# Patient Record
Sex: FEMALE | Race: WHITE | URBAN - METROPOLITAN AREA
[De-identification: names, ages, dates, MRNs, and addresses within clinical notes are randomized per-mention and may not be internally consistent; named-entity substitution may affect disease eponyms.]

---

## 2019-12-05 LAB
C. TRACHOMATIS, EXTERNAL RESULT: NEGATIVE
N. GONORRHOEAE, EXTERNAL RESULT: NEGATIVE

## 2019-12-19 LAB
ABO, EXTERNAL RESULT: NORMAL
HEP B, EXTERNAL RESULT: NEGATIVE
HIV, EXTERNAL RESULT: NORMAL
RH FACTOR, EXTERNAL RESULT: POSITIVE
RPR, EXTERNAL RESULT: NORMAL
RUBELLA TITER, EXTERNAL RESULT: NORMAL

## 2020-06-07 LAB — GBS, EXTERNAL RESULT: NEGATIVE

## 2020-06-30 ENCOUNTER — OFFICE VISIT (OUTPATIENT)
Dept: PRIMARY CARE CLINIC | Age: 30
End: 2020-06-30
Payer: COMMERCIAL

## 2020-06-30 PROCEDURE — 99211 OFF/OP EST MAY X REQ PHY/QHP: CPT | Performed by: NURSE PRACTITIONER

## 2020-06-30 NOTE — PROGRESS NOTES
Patient is having a  with Ayush Mukherjee on 09 and was seen at clinic for prenatal covid test. . Patient instructed to self quarantine until she goes into labor or is instructed to go to hospital.

## 2020-07-02 LAB
SARS-COV-2: NOT DETECTED
SOURCE: NORMAL

## 2020-07-07 ENCOUNTER — HOSPITAL ENCOUNTER (INPATIENT)
Age: 30
LOS: 2 days | Discharge: HOME | End: 2020-07-09
Attending: OBSTETRICS & GYNECOLOGY | Admitting: OBSTETRICS & GYNECOLOGY
Payer: COMMERCIAL

## 2020-07-07 ENCOUNTER — ANESTHESIA (OUTPATIENT)
Dept: LABOR AND DELIVERY | Age: 30
End: 2020-07-07
Payer: COMMERCIAL

## 2020-07-07 ENCOUNTER — ANESTHESIA EVENT (OUTPATIENT)
Dept: LABOR AND DELIVERY | Age: 30
End: 2020-07-07
Payer: COMMERCIAL

## 2020-07-07 VITALS — DIASTOLIC BLOOD PRESSURE: 73 MMHG | SYSTOLIC BLOOD PRESSURE: 120 MMHG | OXYGEN SATURATION: 100 %

## 2020-07-07 PROBLEM — Z98.891 HX OF CESAREAN SECTION: Status: ACTIVE | Noted: 2020-07-07

## 2020-07-07 LAB
ABO/RH: NORMAL
AMPHETAMINE SCREEN, URINE: NORMAL
ANTIBODY SCREEN: NORMAL
BARBITURATE SCREEN URINE: NORMAL
BASOPHILS ABSOLUTE: 0.1 K/UL (ref 0–0.2)
BASOPHILS RELATIVE PERCENT: 0.5 %
BENZODIAZEPINE SCREEN, URINE: NORMAL
BUPRENORPHINE URINE: NORMAL
CANNABINOID SCREEN URINE: NORMAL
COCAINE METABOLITE SCREEN URINE: NORMAL
EOSINOPHILS ABSOLUTE: 0.1 K/UL (ref 0–0.6)
EOSINOPHILS RELATIVE PERCENT: 1 %
HCT VFR BLD CALC: 35.5 % (ref 36–48)
HEMOGLOBIN: 12.1 G/DL (ref 12–16)
LYMPHOCYTES ABSOLUTE: 1.8 K/UL (ref 1–5.1)
LYMPHOCYTES RELATIVE PERCENT: 19.2 %
Lab: NORMAL
MCH RBC QN AUTO: 33.5 PG (ref 26–34)
MCHC RBC AUTO-ENTMCNC: 34 G/DL (ref 31–36)
MCV RBC AUTO: 98.6 FL (ref 80–100)
METHADONE SCREEN, URINE: NORMAL
MONOCYTES ABSOLUTE: 0.7 K/UL (ref 0–1.3)
MONOCYTES RELATIVE PERCENT: 6.8 %
NEUTROPHILS ABSOLUTE: 6.9 K/UL (ref 1.7–7.7)
NEUTROPHILS RELATIVE PERCENT: 72.5 %
OPIATE SCREEN URINE: NORMAL
OXYCODONE URINE: NORMAL
PDW BLD-RTO: 16.6 % (ref 12.4–15.4)
PH UA: 7
PHENCYCLIDINE SCREEN URINE: NORMAL
PLATELET # BLD: 167 K/UL (ref 135–450)
PMV BLD AUTO: 9.7 FL (ref 5–10.5)
PROPOXYPHENE SCREEN: NORMAL
RBC # BLD: 3.6 M/UL (ref 4–5.2)
WBC # BLD: 9.5 K/UL (ref 4–11)

## 2020-07-07 PROCEDURE — 85025 COMPLETE CBC W/AUTO DIFF WBC: CPT

## 2020-07-07 PROCEDURE — 1220000000 HC SEMI PRIVATE OB R&B

## 2020-07-07 PROCEDURE — 86850 RBC ANTIBODY SCREEN: CPT

## 2020-07-07 PROCEDURE — 7100000001 HC PACU RECOVERY - ADDTL 15 MIN: Performed by: OBSTETRICS & GYNECOLOGY

## 2020-07-07 PROCEDURE — 86901 BLOOD TYPING SEROLOGIC RH(D): CPT

## 2020-07-07 PROCEDURE — 2500000003 HC RX 250 WO HCPCS: Performed by: NURSE ANESTHETIST, CERTIFIED REGISTERED

## 2020-07-07 PROCEDURE — 3609079900 HC CESAREAN SECTION: Performed by: OBSTETRICS & GYNECOLOGY

## 2020-07-07 PROCEDURE — 86900 BLOOD TYPING SEROLOGIC ABO: CPT

## 2020-07-07 PROCEDURE — 6360000002 HC RX W HCPCS: Performed by: OBSTETRICS & GYNECOLOGY

## 2020-07-07 PROCEDURE — 2580000003 HC RX 258: Performed by: OBSTETRICS & GYNECOLOGY

## 2020-07-07 PROCEDURE — 2709999900 HC NON-CHARGEABLE SUPPLY: Performed by: OBSTETRICS & GYNECOLOGY

## 2020-07-07 PROCEDURE — 3700000001 HC ADD 15 MINUTES (ANESTHESIA): Performed by: OBSTETRICS & GYNECOLOGY

## 2020-07-07 PROCEDURE — 6360000002 HC RX W HCPCS: Performed by: NURSE ANESTHETIST, CERTIFIED REGISTERED

## 2020-07-07 PROCEDURE — 7100000000 HC PACU RECOVERY - FIRST 15 MIN: Performed by: OBSTETRICS & GYNECOLOGY

## 2020-07-07 PROCEDURE — 3700000000 HC ANESTHESIA ATTENDED CARE: Performed by: OBSTETRICS & GYNECOLOGY

## 2020-07-07 PROCEDURE — 86780 TREPONEMA PALLIDUM: CPT

## 2020-07-07 PROCEDURE — 80307 DRUG TEST PRSMV CHEM ANLYZR: CPT

## 2020-07-07 PROCEDURE — 6370000000 HC RX 637 (ALT 250 FOR IP): Performed by: OBSTETRICS & GYNECOLOGY

## 2020-07-07 RX ORDER — SODIUM CHLORIDE 0.9 % (FLUSH) 0.9 %
10 SYRINGE (ML) INJECTION EVERY 12 HOURS SCHEDULED
Status: DISCONTINUED | OUTPATIENT
Start: 2020-07-07 | End: 2020-07-09 | Stop reason: HOSPADM

## 2020-07-07 RX ORDER — SODIUM CHLORIDE, SODIUM LACTATE, POTASSIUM CHLORIDE, CALCIUM CHLORIDE 600; 310; 30; 20 MG/100ML; MG/100ML; MG/100ML; MG/100ML
INJECTION, SOLUTION INTRAVENOUS CONTINUOUS
Status: DISCONTINUED | OUTPATIENT
Start: 2020-07-07 | End: 2020-07-07

## 2020-07-07 RX ORDER — OXYCODONE HYDROCHLORIDE 5 MG/1
5 TABLET ORAL EVERY 4 HOURS PRN
Status: DISCONTINUED | OUTPATIENT
Start: 2020-07-07 | End: 2020-07-08

## 2020-07-07 RX ORDER — IBUPROFEN 800 MG/1
800 TABLET ORAL EVERY 8 HOURS SCHEDULED
Status: DISCONTINUED | OUTPATIENT
Start: 2020-07-07 | End: 2020-07-09 | Stop reason: HOSPADM

## 2020-07-07 RX ORDER — MORPHINE SULFATE 1 MG/ML
INJECTION, SOLUTION EPIDURAL; INTRATHECAL; INTRAVENOUS
Status: DISPENSED
Start: 2020-07-07 | End: 2020-07-08

## 2020-07-07 RX ORDER — FENTANYL CITRATE 50 UG/ML
INJECTION, SOLUTION INTRAMUSCULAR; INTRAVENOUS PRN
Status: DISCONTINUED | OUTPATIENT
Start: 2020-07-07 | End: 2020-07-07 | Stop reason: SDUPTHER

## 2020-07-07 RX ORDER — SIMETHICONE 80 MG
80 TABLET,CHEWABLE ORAL EVERY 6 HOURS PRN
Status: DISCONTINUED | OUTPATIENT
Start: 2020-07-07 | End: 2020-07-09 | Stop reason: HOSPADM

## 2020-07-07 RX ORDER — FERROUS SULFATE 325(65) MG
325 TABLET ORAL 2 TIMES DAILY WITH MEALS
Status: DISCONTINUED | OUTPATIENT
Start: 2020-07-07 | End: 2020-07-09 | Stop reason: HOSPADM

## 2020-07-07 RX ORDER — EPHEDRINE SULFATE 50 MG/ML
INJECTION, SOLUTION INTRAVENOUS PRN
Status: DISCONTINUED | OUTPATIENT
Start: 2020-07-07 | End: 2020-07-07 | Stop reason: SDUPTHER

## 2020-07-07 RX ORDER — DIPHENHYDRAMINE HYDROCHLORIDE 50 MG/ML
25 INJECTION INTRAMUSCULAR; INTRAVENOUS EVERY 6 HOURS PRN
Status: DISCONTINUED | OUTPATIENT
Start: 2020-07-07 | End: 2020-07-09 | Stop reason: HOSPADM

## 2020-07-07 RX ORDER — SODIUM CHLORIDE 0.9 % (FLUSH) 0.9 %
10 SYRINGE (ML) INJECTION PRN
Status: DISCONTINUED | OUTPATIENT
Start: 2020-07-07 | End: 2020-07-07

## 2020-07-07 RX ORDER — NALBUPHINE HCL 10 MG/ML
5 AMPUL (ML) INJECTION ONCE
Status: DISCONTINUED | OUTPATIENT
Start: 2020-07-07 | End: 2020-07-07

## 2020-07-07 RX ORDER — FENTANYL CITRATE 50 UG/ML
INJECTION, SOLUTION INTRAMUSCULAR; INTRAVENOUS
Status: COMPLETED
Start: 2020-07-07 | End: 2020-07-07

## 2020-07-07 RX ORDER — PRENATAL WITH FERROUS FUM AND FOLIC ACID 3080; 920; 120; 400; 22; 1.84; 3; 20; 10; 1; 12; 200; 27; 25; 2 [IU]/1; [IU]/1; MG/1; [IU]/1; MG/1; MG/1; MG/1; MG/1; MG/1; MG/1; UG/1; MG/1; MG/1; MG/1; MG/1
1 TABLET ORAL DAILY
Status: DISCONTINUED | OUTPATIENT
Start: 2020-07-07 | End: 2020-07-09 | Stop reason: HOSPADM

## 2020-07-07 RX ORDER — EPHEDRINE SULFATE 50 MG/ML
INJECTION, SOLUTION INTRAVENOUS
Status: COMPLETED
Start: 2020-07-07 | End: 2020-07-07

## 2020-07-07 RX ORDER — HYDROXYZINE PAMOATE 25 MG/1
25 CAPSULE ORAL ONCE
Status: COMPLETED | OUTPATIENT
Start: 2020-07-07 | End: 2020-07-07

## 2020-07-07 RX ORDER — ACETAMINOPHEN 500 MG
1000 TABLET ORAL EVERY 8 HOURS SCHEDULED
Status: DISCONTINUED | OUTPATIENT
Start: 2020-07-07 | End: 2020-07-08

## 2020-07-07 RX ORDER — MORPHINE SULFATE 10 MG/ML
INJECTION, SOLUTION INTRAMUSCULAR; INTRAVENOUS PRN
Status: DISCONTINUED | OUTPATIENT
Start: 2020-07-07 | End: 2020-07-07 | Stop reason: SDUPTHER

## 2020-07-07 RX ORDER — DOCUSATE SODIUM 100 MG/1
100 CAPSULE, LIQUID FILLED ORAL 2 TIMES DAILY PRN
Status: DISCONTINUED | OUTPATIENT
Start: 2020-07-07 | End: 2020-07-09 | Stop reason: HOSPADM

## 2020-07-07 RX ORDER — ONDANSETRON 2 MG/ML
INJECTION INTRAMUSCULAR; INTRAVENOUS
Status: COMPLETED
Start: 2020-07-07 | End: 2020-07-07

## 2020-07-07 RX ORDER — METOCLOPRAMIDE HYDROCHLORIDE 5 MG/ML
INJECTION INTRAMUSCULAR; INTRAVENOUS PRN
Status: DISCONTINUED | OUTPATIENT
Start: 2020-07-07 | End: 2020-07-07 | Stop reason: SDUPTHER

## 2020-07-07 RX ORDER — OXYCODONE HYDROCHLORIDE 5 MG/1
10 TABLET ORAL EVERY 4 HOURS PRN
Status: DISCONTINUED | OUTPATIENT
Start: 2020-07-07 | End: 2020-07-08

## 2020-07-07 RX ORDER — SODIUM CHLORIDE 0.9 % (FLUSH) 0.9 %
10 SYRINGE (ML) INJECTION PRN
Status: DISCONTINUED | OUTPATIENT
Start: 2020-07-07 | End: 2020-07-09 | Stop reason: HOSPADM

## 2020-07-07 RX ORDER — KETOROLAC TROMETHAMINE 30 MG/ML
INJECTION, SOLUTION INTRAMUSCULAR; INTRAVENOUS
Status: DISPENSED
Start: 2020-07-07 | End: 2020-07-08

## 2020-07-07 RX ORDER — KETOROLAC TROMETHAMINE 30 MG/ML
30 INJECTION, SOLUTION INTRAMUSCULAR; INTRAVENOUS EVERY 6 HOURS
Status: ACTIVE | OUTPATIENT
Start: 2020-07-07 | End: 2020-07-08

## 2020-07-07 RX ORDER — BUPIVACAINE HYDROCHLORIDE 7.5 MG/ML
INJECTION, SOLUTION INTRASPINAL PRN
Status: DISCONTINUED | OUTPATIENT
Start: 2020-07-07 | End: 2020-07-07 | Stop reason: SDUPTHER

## 2020-07-07 RX ORDER — SODIUM CHLORIDE, SODIUM LACTATE, POTASSIUM CHLORIDE, AND CALCIUM CHLORIDE .6; .31; .03; .02 G/100ML; G/100ML; G/100ML; G/100ML
1000 INJECTION, SOLUTION INTRAVENOUS ONCE
Status: DISCONTINUED | OUTPATIENT
Start: 2020-07-07 | End: 2020-07-07

## 2020-07-07 RX ORDER — KETOROLAC TROMETHAMINE 30 MG/ML
30 INJECTION, SOLUTION INTRAMUSCULAR; INTRAVENOUS EVERY 8 HOURS
Status: DISCONTINUED | OUTPATIENT
Start: 2020-07-07 | End: 2020-07-07

## 2020-07-07 RX ORDER — LANOLIN 100 %
OINTMENT (GRAM) TOPICAL
Status: DISCONTINUED | OUTPATIENT
Start: 2020-07-07 | End: 2020-07-09 | Stop reason: HOSPADM

## 2020-07-07 RX ORDER — HYDROXYZINE PAMOATE 25 MG/1
25 CAPSULE ORAL EVERY 6 HOURS PRN
Status: DISCONTINUED | OUTPATIENT
Start: 2020-07-07 | End: 2020-07-08

## 2020-07-07 RX ORDER — ONDANSETRON 2 MG/ML
INJECTION INTRAMUSCULAR; INTRAVENOUS PRN
Status: DISCONTINUED | OUTPATIENT
Start: 2020-07-07 | End: 2020-07-07 | Stop reason: SDUPTHER

## 2020-07-07 RX ORDER — SODIUM CHLORIDE, SODIUM LACTATE, POTASSIUM CHLORIDE, CALCIUM CHLORIDE 600; 310; 30; 20 MG/100ML; MG/100ML; MG/100ML; MG/100ML
INJECTION, SOLUTION INTRAVENOUS CONTINUOUS
Status: DISCONTINUED | OUTPATIENT
Start: 2020-07-07 | End: 2020-07-09 | Stop reason: HOSPADM

## 2020-07-07 RX ORDER — ONDANSETRON 2 MG/ML
4 INJECTION INTRAMUSCULAR; INTRAVENOUS EVERY 6 HOURS PRN
Status: DISCONTINUED | OUTPATIENT
Start: 2020-07-07 | End: 2020-07-07

## 2020-07-07 RX ORDER — SODIUM CHLORIDE 0.9 % (FLUSH) 0.9 %
10 SYRINGE (ML) INJECTION EVERY 12 HOURS SCHEDULED
Status: DISCONTINUED | OUTPATIENT
Start: 2020-07-07 | End: 2020-07-07

## 2020-07-07 RX ORDER — METOCLOPRAMIDE HYDROCHLORIDE 5 MG/ML
INJECTION INTRAMUSCULAR; INTRAVENOUS
Status: COMPLETED
Start: 2020-07-07 | End: 2020-07-07

## 2020-07-07 RX ORDER — OXYTOCIN 10 [USP'U]/ML
INJECTION, SOLUTION INTRAMUSCULAR; INTRAVENOUS PRN
Status: DISCONTINUED | OUTPATIENT
Start: 2020-07-07 | End: 2020-07-07 | Stop reason: SDUPTHER

## 2020-07-07 RX ADMIN — SODIUM CHLORIDE, POTASSIUM CHLORIDE, SODIUM LACTATE AND CALCIUM CHLORIDE: 600; 310; 30; 20 INJECTION, SOLUTION INTRAVENOUS at 10:50

## 2020-07-07 RX ADMIN — SODIUM CHLORIDE, POTASSIUM CHLORIDE, SODIUM LACTATE AND CALCIUM CHLORIDE: 600; 310; 30; 20 INJECTION, SOLUTION INTRAVENOUS at 08:50

## 2020-07-07 RX ADMIN — OXYTOCIN 20 UNITS: 10 INJECTION INTRAVENOUS at 13:43

## 2020-07-07 RX ADMIN — DOCUSATE SODIUM 100 MG: 100 CAPSULE, LIQUID FILLED ORAL at 21:50

## 2020-07-07 RX ADMIN — FENTANYL CITRATE 50 MCG: 50 INJECTION INTRAMUSCULAR; INTRAVENOUS at 14:15

## 2020-07-07 RX ADMIN — DIPHENHYDRAMINE HYDROCHLORIDE 25 MG: 50 INJECTION, SOLUTION INTRAMUSCULAR; INTRAVENOUS at 16:26

## 2020-07-07 RX ADMIN — OXYTOCIN 10 UNITS: 10 INJECTION INTRAVENOUS at 14:10

## 2020-07-07 RX ADMIN — FAMOTIDINE 20 MG: 10 INJECTION, SOLUTION INTRAVENOUS at 12:24

## 2020-07-07 RX ADMIN — IBUPROFEN 800 MG: 800 TABLET, FILM COATED ORAL at 22:06

## 2020-07-07 RX ADMIN — KETOROLAC TROMETHAMINE 30 MG: 30 INJECTION, SOLUTION INTRAMUSCULAR at 15:12

## 2020-07-07 RX ADMIN — HYDROXYZINE PAMOATE 25 MG: 25 CAPSULE ORAL at 18:07

## 2020-07-07 RX ADMIN — HYDROXYZINE PAMOATE 25 MG: 25 CAPSULE ORAL at 22:31

## 2020-07-07 RX ADMIN — ACETAMINOPHEN 1000 MG: 500 TABLET ORAL at 18:17

## 2020-07-07 RX ADMIN — SODIUM CHLORIDE, POTASSIUM CHLORIDE, SODIUM LACTATE AND CALCIUM CHLORIDE: 600; 310; 30; 20 INJECTION, SOLUTION INTRAVENOUS at 15:32

## 2020-07-07 RX ADMIN — MORPHINE SULFATE 0.2 MG: 10 INJECTION, SOLUTION INTRAMUSCULAR; INTRAVENOUS at 13:16

## 2020-07-07 RX ADMIN — EPHEDRINE SULFATE 10 MG: 50 INJECTION INTRAMUSCULAR; INTRAVENOUS; SUBCUTANEOUS at 13:18

## 2020-07-07 RX ADMIN — OXYCODONE 10 MG: 5 TABLET ORAL at 19:38

## 2020-07-07 RX ADMIN — CEFAZOLIN 1 G: 1 INJECTION, POWDER, FOR SOLUTION INTRAMUSCULAR; INTRAVENOUS at 13:14

## 2020-07-07 RX ADMIN — METOCLOPRAMIDE HYDROCHLORIDE 10 MG: 5 INJECTION INTRAMUSCULAR; INTRAVENOUS at 12:24

## 2020-07-07 RX ADMIN — BUPIVACAINE HYDROCHLORIDE 1.6 ML: 7.5 INJECTION, SOLUTION SUBARACHNOID at 13:16

## 2020-07-07 RX ADMIN — OXYCODONE 10 MG: 5 TABLET ORAL at 23:50

## 2020-07-07 RX ADMIN — ONDANSETRON 4 MG: 2 INJECTION INTRAMUSCULAR; INTRAVENOUS at 12:24

## 2020-07-07 RX ADMIN — FENTANYL CITRATE 10 MCG: 50 INJECTION INTRAMUSCULAR; INTRAVENOUS at 13:16

## 2020-07-07 RX ADMIN — SODIUM CHLORIDE, POTASSIUM CHLORIDE, SODIUM LACTATE AND CALCIUM CHLORIDE: 600; 310; 30; 20 INJECTION, SOLUTION INTRAVENOUS at 14:10

## 2020-07-07 RX ADMIN — SODIUM CHLORIDE, POTASSIUM CHLORIDE, SODIUM LACTATE AND CALCIUM CHLORIDE: 600; 310; 30; 20 INJECTION, SOLUTION INTRAVENOUS at 12:53

## 2020-07-07 ASSESSMENT — PULMONARY FUNCTION TESTS
PIF_VALUE: 0

## 2020-07-07 ASSESSMENT — PAIN SCALES - GENERAL
PAINLEVEL_OUTOF10: 7
PAINLEVEL_OUTOF10: 0
PAINLEVEL_OUTOF10: 6
PAINLEVEL_OUTOF10: 9
PAINLEVEL_OUTOF10: 9
PAINLEVEL_OUTOF10: 8

## 2020-07-07 NOTE — BRIEF OP NOTE
Brief Postoperative Note      Patient: Jose L Vega  YOB: 1990  MRN: [id number]    Date of Procedure: 2020    Pre-Op Diagnosis: repeat    Post-Op Diagnosis: Same       Procedure(s):   SECTION    Surgeon(s):  Blane Aquino MD    Assistant:  Surgical Assistant: Gia Vital    Anesthesia: Spinal    Estimated Blood Loss (mL): 228    Complications: None    Specimens:   * No specimens in log *    Implants:  * No implants in log *      Drains:   Urethral Catheter Non-latex (Active)       Findings: Normal uterus, tubes and ovaries  VMI, Apgars 8 and 9 at one and five minutes respectively  Wt: 3160 grams = 6 lbs 15.5 oz, 3 VC, placenta intact    Electronically signed by Blane Aquino MD on 2020 at 2:22 PM

## 2020-07-07 NOTE — H&P
Department of Obstetrics and Gynecology  Labor and Delivery   Attending Progress Note      SUBJECTIVE:  Patient presents for repeat C/S, declines trial of labor. OBJECTIVE:      Fetal heart rate: Cat 1         Contraction frequency: irregular    Membranes:  Intact    Cervix:  Not examined                   ASSESSMENT & PLAN:    26 y.o.    OB History        5    Para   2    Term   2            AB   2    Living   2       SAB   2    TAB        Ectopic        Molar        Multiple        Live Births   2             39w0d  1. FWB: reassuring  2. ACOG reviewed. B+, GBS-. Hx of C/S x 2, declines trial of labor. Anemia: taking Fe. Hx of HSV, on Valtrex, no outbreaks or prodromal symptoms.

## 2020-07-07 NOTE — ANESTHESIA PRE PROCEDURE
Department of Anesthesiology  Preprocedure Note       Name:  Corrie Perdomo   Age:  26 y.o.  :  1990                                          MRN:  [id number]         Date:  2020      Surgeon: Mendoza Chauhan):  Doroteo Razo MD    Procedure: Procedure(s):   SECTION    Medications prior to admission:   Prior to Admission medications    Medication Sig Start Date End Date Taking?  Authorizing Provider   valACYclovir (VALTREX) 500 MG tablet Take 1 tablet by mouth daily 18  Yes Historical Provider, MD   Prenatal Vit-Fe Fumarate-FA (PRENATAL 1+1 PO) Take 1 tablet by mouth daily 18  Yes Historical Provider, MD   ferrous sulfate (IRON 325) 325 (65 Fe) MG tablet Take 325 mg by mouth daily (with breakfast)   Yes Historical Provider, MD   omeprazole (PRILOSEC) 10 MG delayed release capsule Take 10 mg by mouth daily   Yes Historical Provider, MD   docusate sodium (COLACE) 100 MG capsule Take 100 mg by mouth 2 times daily   Yes Historical Provider, MD       Current medications:    Current Facility-Administered Medications   Medication Dose Route Frequency Provider Last Rate Last Dose    lactated ringers infusion   Intravenous Continuous Doroteo Razo  mL/hr at 20 0850      lactated ringers infusion   Intravenous Continuous Doroteo Razo  mL/hr at 20 1050      lactated ringers bolus  1,000 mL Intravenous Once Doroteo Razo MD        sodium chloride flush 0.9 % injection 10 mL  10 mL Intravenous 2 times per day Doroteo Razo MD        sodium chloride flush 0.9 % injection 10 mL  10 mL Intravenous PRN Doroteo Razo MD        ondansetron [facility] injection 4 mg  4 mg Intravenous Q6H PRN Doroteo Razo MD        ceFAZolin (ANCEF) 1 g in dextrose 5 % 50 mL IVPB (mini-bag)  1 g Intravenous Once Doroteo Razo MD           Allergies:  No Known Allergies    Problem List:    Patient Active Problem List Diagnosis Code    Hx of  section Z98.891       Past Medical History:        Diagnosis Date    Anemia     G1    Herpes simplex     never had outbreak       Past Surgical History:        Procedure Laterality Date    APPENDECTOMY      BREAST SURGERY      TONSILLECTOMY         Social History:    Social History     Tobacco Use    Smoking status: Never Smoker    Smokeless tobacco: Never Used   Substance Use Topics    Alcohol use: Not Currently                                Counseling given: Not Answered      Vital Signs (Current):   Vitals:    20 0847 20 0854   BP:  107/71   Pulse:  90   Resp:  16   Temp:  37 °C (98.6 °F)   TempSrc:  Oral   Weight: 161 lb (73 kg)    Height: 5' 3\" (1.6 m)                                               BP Readings from Last 3 Encounters:   20 107/71   20 110/74       NPO Status: Time of last liquid consumption:                         Time of last solid consumption:                         Date of last liquid consumption: 20                        Date of last solid food consumption: 20    BMI:   Wt Readings from Last 3 Encounters:   20 161 lb (73 kg)   20 161 lb (73 kg)     Body mass index is 28.52 kg/m². CBC:   Lab Results   Component Value Date    WBC 9.5 2020    RBC 3.60 2020    HGB 12.1 2020    HCT 35.5 2020    MCV 98.6 2020    RDW 16.6 2020     2020       CMP: No results found for: NA, K, CL, CO2, BUN, CREATININE, GFRAA, AGRATIO, LABGLOM, GLUCOSE, PROT, CALCIUM, BILITOT, ALKPHOS, AST, ALT    POC Tests: No results for input(s): POCGLU, POCNA, POCK, POCCL, POCBUN, POCHEMO, POCHCT in the last 72 hours.     Coags: No results found for: PROTIME, INR, APTT    HCG (If Applicable): No results found for: PREGTESTUR, PREGSERUM, HCG, HCGQUANT     ABGs: No results found for: PHART, PO2ART, NEW4GDR, BMY6RVB, BEART, D7VRVYRC     Type & Screen (If Applicable):  No results found for: Ольга Boy    Drug/Infectious Status (If Applicable):  No results found for: HIV, HEPCAB    COVID-19 Screening (If Applicable): No results found for: COVID19      Anesthesia Evaluation  Patient summary reviewed and Nursing notes reviewed no history of anesthetic complications:   Airway: Mallampati: II     Neck ROM: full   Dental: normal exam         Pulmonary:Negative Pulmonary ROS and normal exam                               Cardiovascular:Negative CV ROS                      Neuro/Psych:   Negative Neuro/Psych ROS              GI/Hepatic/Renal: Neg GI/Hepatic/Renal ROS            Endo/Other: Negative Endo/Other ROS                    Abdominal:           Vascular:                                        Anesthesia Plan      spinal     ASA 2 - emergent     (I discussed with the patient the risks and benefits of PIV, neuraxial anesthesia with narcotics, with general anesthesia backup. All questions were answered the patient agrees with the plan. Recent Vitals:  -------------------------              07/07/20                    0854        -------------------------   BP:         107/71        Pulse:        90          Resp:         16          Temp:   37 °C (98.6 °F)  -------------------------  Body mass index is 28.52 kg/m².     Social History    Tobacco Use      Smoking status: Never Smoker      Smokeless tobacco: Never Used      LABS:    CBC  Lab Results       Component                Value               Date/Time                  WBC                      9.5                 07/07/2020 08:50 AM        HGB                      12.1                07/07/2020 08:50 AM        HCT                      35.5 (L)            07/07/2020 08:50 AM        PLT                      167                 07/07/2020 08:50 AM   RENAL  No results found for: NA, K, CL, CO2, BUN, CREATININE, GLUCOSE  COAGS  No results found for: PROTIME, INR, APTT)        Anesthetic plan and risks discussed with patient.                       Ceasar Elkins, APRN - CRNA   7/7/2020

## 2020-07-07 NOTE — ANESTHESIA PROCEDURE NOTES
Spinal Block    Patient location during procedure: OR  Reason for block: primary anesthetic  Staffing  Resident/CRNA: NYDIA Perez CRNA  Preanesthetic Checklist  Completed: patient identified, surgical consent, pre-op evaluation, timeout performed, IV checked, risks and benefits discussed, monitors and equipment checked, anesthesia consent given, oxygen available and patient being monitored  Spinal Block  Patient position: sitting  Prep: ChloraPrep  Patient monitoring: continuous pulse ox and frequent blood pressure checks  Approach: midline  Location: L3/L4  Provider prep: mask and sterile gloves  Local infiltration: lidocaine  Dose: 0.2  Agent: bupivacaine  Adjuvant: duramorph  Dose: 1.6  Dose: 1.6  Needle  Needle type: Pencan   Needle gauge: 24 G  Needle length: 4 in  Assessment  Sensory level: T4  Swirl obtained: Yes  CSF: clear  Attempts: 1  Hemodynamics: stable

## 2020-07-08 LAB
HCT VFR BLD CALC: 31.5 % (ref 36–48)
HEMOGLOBIN: 10.6 G/DL (ref 12–16)
MCH RBC QN AUTO: 33.2 PG (ref 26–34)
MCHC RBC AUTO-ENTMCNC: 33.7 G/DL (ref 31–36)
MCV RBC AUTO: 98.3 FL (ref 80–100)
PDW BLD-RTO: 16.2 % (ref 12.4–15.4)
PLATELET # BLD: 170 K/UL (ref 135–450)
PMV BLD AUTO: 9.7 FL (ref 5–10.5)
RBC # BLD: 3.21 M/UL (ref 4–5.2)
TOTAL SYPHILLIS IGG/IGM: NORMAL
WBC # BLD: 11.3 K/UL (ref 4–11)

## 2020-07-08 PROCEDURE — 85027 COMPLETE CBC AUTOMATED: CPT

## 2020-07-08 PROCEDURE — 36415 COLL VENOUS BLD VENIPUNCTURE: CPT

## 2020-07-08 PROCEDURE — 2580000003 HC RX 258: Performed by: OBSTETRICS & GYNECOLOGY

## 2020-07-08 PROCEDURE — 1220000000 HC SEMI PRIVATE OB R&B

## 2020-07-08 PROCEDURE — 6370000000 HC RX 637 (ALT 250 FOR IP): Performed by: OBSTETRICS & GYNECOLOGY

## 2020-07-08 RX ORDER — DIPHENHYDRAMINE HCL 25 MG
25 TABLET ORAL EVERY 6 HOURS PRN
Status: DISCONTINUED | OUTPATIENT
Start: 2020-07-08 | End: 2020-07-09 | Stop reason: HOSPADM

## 2020-07-08 RX ORDER — OXYCODONE HYDROCHLORIDE AND ACETAMINOPHEN 5; 325 MG/1; MG/1
1 TABLET ORAL EVERY 4 HOURS PRN
Status: DISCONTINUED | OUTPATIENT
Start: 2020-07-08 | End: 2020-07-09

## 2020-07-08 RX ORDER — OXYCODONE HYDROCHLORIDE AND ACETAMINOPHEN 5; 325 MG/1; MG/1
2 TABLET ORAL EVERY 4 HOURS PRN
Status: DISCONTINUED | OUTPATIENT
Start: 2020-07-08 | End: 2020-07-09

## 2020-07-08 RX ADMIN — SIMETHICONE 80 MG: 80 TABLET, CHEWABLE ORAL at 17:15

## 2020-07-08 RX ADMIN — OXYCODONE 10 MG: 5 TABLET ORAL at 03:55

## 2020-07-08 RX ADMIN — DOCUSATE SODIUM 100 MG: 100 CAPSULE, LIQUID FILLED ORAL at 07:59

## 2020-07-08 RX ADMIN — IBUPROFEN 800 MG: 800 TABLET, FILM COATED ORAL at 07:19

## 2020-07-08 RX ADMIN — OXYCODONE 10 MG: 5 TABLET ORAL at 07:59

## 2020-07-08 RX ADMIN — METFORMIN HYDROCHLORIDE 1 TABLET: 500 TABLET, EXTENDED RELEASE ORAL at 07:59

## 2020-07-08 RX ADMIN — OXYCODONE HYDROCHLORIDE AND ACETAMINOPHEN 2 TABLET: 5; 325 TABLET ORAL at 23:54

## 2020-07-08 RX ADMIN — SIMETHICONE 80 MG: 80 TABLET, CHEWABLE ORAL at 10:30

## 2020-07-08 RX ADMIN — ACETAMINOPHEN 1000 MG: 500 TABLET ORAL at 13:08

## 2020-07-08 RX ADMIN — DOCUSATE SODIUM 100 MG: 100 CAPSULE, LIQUID FILLED ORAL at 20:28

## 2020-07-08 RX ADMIN — OXYCODONE 10 MG: 5 TABLET ORAL at 12:11

## 2020-07-08 RX ADMIN — HYDROXYZINE PAMOATE 25 MG: 25 CAPSULE ORAL at 17:15

## 2020-07-08 RX ADMIN — IBUPROFEN 800 MG: 800 TABLET, FILM COATED ORAL at 23:58

## 2020-07-08 RX ADMIN — SIMETHICONE 80 MG: 80 TABLET, CHEWABLE ORAL at 03:48

## 2020-07-08 RX ADMIN — IBUPROFEN 800 MG: 800 TABLET, FILM COATED ORAL at 15:36

## 2020-07-08 RX ADMIN — HYDROXYZINE PAMOATE 25 MG: 25 CAPSULE ORAL at 04:59

## 2020-07-08 RX ADMIN — OXYCODONE 10 MG: 5 TABLET ORAL at 16:11

## 2020-07-08 RX ADMIN — HYDROXYZINE PAMOATE 25 MG: 25 CAPSULE ORAL at 10:30

## 2020-07-08 RX ADMIN — OXYCODONE HYDROCHLORIDE AND ACETAMINOPHEN 2 TABLET: 5; 325 TABLET ORAL at 20:28

## 2020-07-08 RX ADMIN — Medication 10 ML: at 08:01

## 2020-07-08 RX ADMIN — ACETAMINOPHEN 1000 MG: 500 TABLET ORAL at 03:48

## 2020-07-08 RX ADMIN — SIMETHICONE 80 MG: 80 TABLET, CHEWABLE ORAL at 23:53

## 2020-07-08 ASSESSMENT — PAIN SCALES - GENERAL
PAINLEVEL_OUTOF10: 9
PAINLEVEL_OUTOF10: 10
PAINLEVEL_OUTOF10: 10
PAINLEVEL_OUTOF10: 9
PAINLEVEL_OUTOF10: 10
PAINLEVEL_OUTOF10: 9
PAINLEVEL_OUTOF10: 9
PAINLEVEL_OUTOF10: 8
PAINLEVEL_OUTOF10: 6

## 2020-07-08 NOTE — PROGRESS NOTES
Subjective:     Postpartum Day 1:  Delivery    The patient feels well. The patient denies emotional concerns. Pain is well controlled with current medications. The baby iswell. Baby is feeding via breast. Urinary output is adequate. The patient is ambulating well. The patient is tolerating a normal diet. Flatus has been passed.     Objective:        Vitals:    20 0803   BP: (!) 108/58   Pulse: 85   Resp: 16   Temp: 97.8 °F (36.6 °C)   SpO2:        Lab Results   Component Value Date    WBC 11.3 (H) 2020    HGB 10.6 (L) 2020    HCT 31.5 (L) 2020    MCV 98.3 2020     2020     Admission on 2020   Component Date Value Ref Range Status    WBC 2020 9.5  4.0 - 11.0 K/uL Final    RBC 2020 3.60* 4.00 - 5.20 M/uL Final    Hemoglobin 2020 12.1  12.0 - 16.0 g/dL Final    Hematocrit 2020 35.5* 36.0 - 48.0 % Final    MCV 2020 98.6  80.0 - 100.0 fL Final    MCH 2020 33.5  26.0 - 34.0 pg Final    MCHC 2020 34.0  31.0 - 36.0 g/dL Final    RDW 2020 16.6* 12.4 - 15.4 % Final    Platelets  167  135 - 450 K/uL Final    MPV 2020 9.7  5.0 - 10.5 fL Final    Neutrophils % 2020 72.5  % Final    Lymphocytes % 2020 19.2  % Final    Monocytes % 2020 6.8  % Final    Eosinophils % 2020 1.0  % Final    Basophils % 2020 0.5  % Final    Neutrophils Absolute 2020 6.9  1.7 - 7.7 K/uL Final    Lymphocytes Absolute 2020 1.8  1.0 - 5.1 K/uL Final    Monocytes Absolute 2020 0.7  0.0 - 1.3 K/uL Final    Eosinophils Absolute 2020 0.1  0.0 - 0.6 K/uL Final    Basophils Absolute 2020 0.1  0.0 - 0.2 K/uL Final    ABO/Rh 2020 B POS   Final    Antibody Screen 2020 NEG   Final    Total Syphillis IgG/IgM 2020 Non-Reactive  Non-reactive Final    Amphetamine Screen, Urine 2020 Neg  Negative <1000ng/mL Final    Barbiturate Screen, Ur 07/07/2020 Neg  Negative <200 ng/mL Final    Benzodiazepine Screen, Urine 07/07/2020 Neg  Negative <200 ng/mL Final    Cannabinoid Scrn, Ur 07/07/2020 Neg  Negative <50 ng/mL Final    Cocaine Metabolite Screen, Urine 07/07/2020 Neg  Negative <300 ng/mL Final    Opiate Scrn, Ur 07/07/2020 Neg  Negative <300 ng/mL Final    PCP Screen, Urine 07/07/2020 Neg  Negative <25 ng/mL Final    Methadone Screen, Urine 07/07/2020 Neg  Negative <300 ng/mL Final    Propoxyphene Scrn, Ur 07/07/2020 Neg  Negative <300 ng/mL Final    Oxycodone Urine 07/07/2020 Neg  Negative <100 ng/ml Final    Buprenorphine Urine 07/07/2020 Neg  Negative <5 ng/ml Final    pH, UA 07/07/2020 7.0   Final    Drug Screen Comment: 07/07/2020 see below   Final    Rh Factor, External Result 12/19/2019 Positive   Final    HIV, External Result 12/19/2019 Non-Reactive   Final    RPR, External Result 12/19/2019 Non-Reactive   Final    GBS, External Result 06/07/2020 Negative   Final    ABO, External Result 12/19/2019 B   Final    Hep B, External Result 12/19/2019 Negative   Final    Rubella Titer, External Result 12/19/2019 Immune   Final    C. Trachomatis, External Result 12/05/2019 Negative   Final    N.  Gonorrhoeae, External Result 12/05/2019 Negative   Final    WBC 07/08/2020 11.3* 4.0 - 11.0 K/uL Final    RBC 07/08/2020 3.21* 4.00 - 5.20 M/uL Final    Hemoglobin 07/08/2020 10.6* 12.0 - 16.0 g/dL Final    Hematocrit 07/08/2020 31.5* 36.0 - 48.0 % Final    MCV 07/08/2020 98.3  80.0 - 100.0 fL Final    MCH 07/08/2020 33.2  26.0 - 34.0 pg Final    MCHC 07/08/2020 33.7  31.0 - 36.0 g/dL Final    RDW 07/08/2020 16.2* 12.4 - 15.4 % Final    Platelets 84/08/2278 170  135 - 450 K/uL Final    MPV 07/08/2020 9.7  5.0 - 10.5 fL Final       General:    alert   Lungs:    Lochia:  appropriate   Uterine    firm   Incision:  healing well, no significant drainage   DVT Evaluation:  No evidence of DVT seen on physical exam.     Assessment: Status post  section. Doing well postoperatively. Plan:     Continue current care. Consider d/c tomorrow  Male infant-circ reviewed.     Oliver Beatty

## 2020-07-08 NOTE — FLOWSHEET NOTE
Patient out of bed for first time post-op. Ambulated to bathroom, dia and victor care performed with assistance from this RN. Patient brushed her teeth and washed up at sink in bathroom. Gown changed. Comfort pad placed on bed, bed linens changed. Pt returned to bed without complication. Tolerated well.

## 2020-07-08 NOTE — L&D DELIVERY SUMMARY NOTE
[address] [address]                            LABOR AND DELIVERY NOTE    PATIENT NAME: Mor Hopper                     :        1990  MED REC NO:   [id number]                          ROOM:       7125  ACCOUNT NO:   [de-identified]                           ADMIT DATE: 2020  PROVIDER:     Boubacar Watson MD    DATE OF PROCEDURE:  2020    PREOPERATIVE DIAGNOSIS:  History of a prior  x2, desires repeat  . POSTOPERATIVE DIAGNOSIS:  History of a prior  x2, desires  repeat . PROCEDURE PERFORMED:  Repeat low-transverse  section. SURGEON:  ALBERT Oates MD    SURGICAL ASSISTANT:  Andres Gross. ANESTHESIA:  Spinal.    ESTIMATED BLOOD LOSS:  700 mL. COMPLICATIONS:  None. CONDITION:  Stable. FINDINGS:  Normal uterus, tubes and ovaries. Viable male infant. Apgars 8 and 9 at one and five minutes respectively. Weight 3160 gm,  which is equivalent to 6 pounds 15.5 ounces. There was a three-vessel  cord and the placenta was delivered, intact. HISTORY OF PRESENT ILLNESS:  The patient is a 13-year-old G5, P2-0-2-2  with a history of two C-sections, presented at 39 weeks and 0 days for  repeat . The risks of proceeding with surgery including  infection, anesthesia, bleeding, and injury to other organs, not limited  to the bowel and bladder were discussed with the patient. She reported  understanding and consented to proceed. DESCRIPTION OF PROCEDURE:  She was taken to the operating room where  spinal anesthesia was performed. A Minaya catheter was placed. She was  prepped and draped in the normal sterile fashion. A time-out was  performed. Then, the skin incision was made with a scalpel and carried  down to the underlying fascia. The fascia was nicked in the midline and  extended laterally with the Mor scissors.   The superior aspect of the  fascia was grasped with Kocher clamps, tented up and the rectus muscles  were dissected off combination with a scalpel and bluntly and Juares  scissors. The inferior aspect of the fascia was grasped with Kocher  clamps, tented up, and the rectus muscles were dissected off with a  scalpel and Juares scissors. The rectus muscles were  in the  midline and the peritoneum was entered with a hemostat. The incision  was extended superiorly and inferiorly with good visualization of the  bladder. The bladder blade was placed. The vesicouterine peritoneum  was grasped with pickups, entered sharply with Metzenbaum scissors and  extended laterally with Metzenbaum scissors. The bladder flap was  created digitally and the bladder blade was replaced. The uterine  incision was made with the scalpel and extended laterally with bandage  scissors. An artificial rupture of membranes with return of clear  fluids was performed with an Allis clamp. The incision was extended  additionally by superior and inferior traction. The infant was  delivered in the cephalic position atraumatically. The cord was doubly  clamped and cut and the infant was handed to the awaiting nurse for  evaluation. The placenta was removed, intact with gentle traction. The  uterus was exteriorized and cleared of all clots and debris. The  bladder blade was replaced and the uterine incision was closed with 0  Vicryl in a running locked fashion. A second suture of the same was  used to imbricate the first.  A small hematoma that was stable  throughout the process was noted on the left apex. The uterus was  returned to the abdomen and the gutters were irrigated and cleared of  all clots and debris. The bladder blade was replaced and the uterine  incision was revisualized in situ and noted to be hemostatic. The  hematoma was also stable. The bladder blade was removed and the  peritoneum was reapproximated with 2-0 Vicryl.   The fascia was  reapproximated with 0 Vicryl in a running fashion. The subcutaneous  tissue was reapproximated with 3-0 Vicryl. The skin was reapproximated  with 4-0 Vicryl. The patient tolerated the procedure well and was taken  to Recovery in stable condition.         Tanner Olivas MD    D: 07/07/2020 14:31:28       T: 07/08/2020 1:56:13     [email or web address]  Job#: [id number]     Doc#: [id number]    CC:

## 2020-07-08 NOTE — ANESTHESIA POSTPROCEDURE EVALUATION
Department of Anesthesiology  Postprocedure Note    Patient: Radha Acosta  MRN: [id number]  YOB: 1990  Date of evaluation: 2020  Time:  10:44 AM     Procedure Summary     Date:  20 Room / Location:  [address]    Anesthesia Start:  1310 Anesthesia Stop:  3204    Procedure:   SECTION (N/A Abdomen) Diagnosis:  (repeat)    Surgeon:  Shani Pinon MD Responsible Provider:  Priscilla Prader, MD    Anesthesia Type:  spinal ASA Status:  2 - Emergent          Anesthesia Type: spinal    Daengelo Phase I: Deangelo Score: 9    Deangelo Phase II: Deangelo Score: 10    Last vitals: Reviewed and per EMR flowsheets.        Anesthesia Post Evaluation    Patient location during evaluation: bedside  Patient participation: complete - patient participated  Level of consciousness: awake and alert  Airway patency: patent  Nausea & Vomiting: no nausea and no vomiting  Complications: no  Cardiovascular status: hemodynamically stable  Respiratory status: acceptable  Hydration status: stable

## 2020-07-08 NOTE — FLOWSHEET NOTE
Pt. Hayes Mario; informed of dr. Yessi Bianchi rquesting to do circ and need for circ permit to be signed by pt.; pt. Asks if fob can sign; informed fob cannot;mob signed circ permit; denies needs; fob in room; fob requesting pt.'s bed linen be changed; agreed will change linen but at the moment having to do a circ w/dr. Yessi Bianchi and their baby would be next circ also, then will change linen; fob huffy and states he'll just change the bed; informed that is not necessary, just cannot do it immediately; fob asks where linen is and states he'll do it; charge RN notified and requested assistance with changing linen immediately; charge ANGEL Solis and CAROL Clark into room to change bed linen; baby having 24 hour testing completed and then will go for circ; pt.  In shower when this RN left room;

## 2020-07-09 VITALS
WEIGHT: 161 LBS | SYSTOLIC BLOOD PRESSURE: 113 MMHG | BODY MASS INDEX: 28.53 KG/M2 | OXYGEN SATURATION: 98 % | HEIGHT: 63 IN | RESPIRATION RATE: 16 BRPM | HEART RATE: 82 BPM | DIASTOLIC BLOOD PRESSURE: 64 MMHG | TEMPERATURE: 97.6 F

## 2020-07-09 PROCEDURE — 6370000000 HC RX 637 (ALT 250 FOR IP): Performed by: OBSTETRICS & GYNECOLOGY

## 2020-07-09 RX ORDER — OXYCODONE HYDROCHLORIDE 5 MG/1
10 TABLET ORAL ONCE
Status: COMPLETED | OUTPATIENT
Start: 2020-07-09 | End: 2020-07-09

## 2020-07-09 RX ORDER — OXYCODONE HYDROCHLORIDE AND ACETAMINOPHEN 5; 325 MG/1; MG/1
1 TABLET ORAL EVERY 6 HOURS PRN
Qty: 28 TABLET | Refills: 0 | Status: SHIPPED | OUTPATIENT
Start: 2020-07-09 | End: 2020-07-16

## 2020-07-09 RX ORDER — OXYCODONE HYDROCHLORIDE 5 MG/1
TABLET ORAL
Status: DISCONTINUED
Start: 2020-07-09 | End: 2020-07-09 | Stop reason: HOSPADM

## 2020-07-09 RX ORDER — OXYCODONE HYDROCHLORIDE AND ACETAMINOPHEN 5; 325 MG/1; MG/1
2 TABLET ORAL
Status: DISCONTINUED | OUTPATIENT
Start: 2020-07-09 | End: 2020-07-09 | Stop reason: HOSPADM

## 2020-07-09 RX ORDER — VALACYCLOVIR HYDROCHLORIDE 500 MG/1
500 TABLET, FILM COATED ORAL DAILY
Qty: 30 TABLET | Refills: 3 | Status: SHIPPED | OUTPATIENT
Start: 2020-07-09 | End: 2020-12-14 | Stop reason: ALTCHOICE

## 2020-07-09 RX ORDER — IBUPROFEN 800 MG/1
800 TABLET ORAL EVERY 8 HOURS SCHEDULED
Qty: 120 TABLET | Refills: 3 | COMMUNITY
Start: 2020-07-09 | End: 2020-10-20

## 2020-07-09 RX ORDER — OXYCODONE HYDROCHLORIDE AND ACETAMINOPHEN 5; 325 MG/1; MG/1
1 TABLET ORAL
Status: DISCONTINUED | OUTPATIENT
Start: 2020-07-09 | End: 2020-07-09 | Stop reason: HOSPADM

## 2020-07-09 RX ADMIN — OXYCODONE HYDROCHLORIDE AND ACETAMINOPHEN 2 TABLET: 5; 325 TABLET ORAL at 13:52

## 2020-07-09 RX ADMIN — OXYCODONE 10 MG: 5 TABLET ORAL at 10:10

## 2020-07-09 RX ADMIN — DOCUSATE SODIUM 100 MG: 100 CAPSULE, LIQUID FILLED ORAL at 09:19

## 2020-07-09 RX ADMIN — METFORMIN HYDROCHLORIDE 1 TABLET: 500 TABLET, EXTENDED RELEASE ORAL at 09:18

## 2020-07-09 RX ADMIN — OXYCODONE HYDROCHLORIDE AND ACETAMINOPHEN 2 TABLET: 5; 325 TABLET ORAL at 06:36

## 2020-07-09 RX ADMIN — IBUPROFEN 800 MG: 800 TABLET, FILM COATED ORAL at 08:19

## 2020-07-09 RX ADMIN — OXYCODONE HYDROCHLORIDE AND ACETAMINOPHEN 2 TABLET: 5; 325 TABLET ORAL at 03:11

## 2020-07-09 ASSESSMENT — PAIN SCALES - GENERAL
PAINLEVEL_OUTOF10: 7
PAINLEVEL_OUTOF10: 10
PAINLEVEL_OUTOF10: 9
PAINLEVEL_OUTOF10: 8
PAINLEVEL_OUTOF10: 9

## 2020-07-09 NOTE — PROGRESS NOTES
Reviewed discharge instructions with patient and spouse. Questions answered. Pt verbalized understanding of verbal and written discharge instructions and denies having questions at this time.

## 2020-07-09 NOTE — PROGRESS NOTES
Department of Obstetrics and Gynecology  Labor and Delivery  Attending Post Partum Progress Note      SUBJECTIVE:  Pt without complaints, pain controlled currently, tolerating po, lochia wnl,  the patient is currently breastfeeding. positive flatus. OBJECTIVE:      Vitals:  Vitals:    20 0548   BP: 113/64   Pulse: 82   Resp: 16   Temp: 97.6 °F (36.4 °C)   SpO2:        RRR CTAB  ABDOMEN:  soft, non-distended, non-tender, + BS  FF below umbilicus  Incision: c/d/i with steri-strips  EXT: no edema    DATA:    CBC:    Lab Results   Component Value Date    WBC 11.3 2020    HGB 10.6 2020    HCT 31.5 2020     2020       ASSESSMENT & PLAN:    26 y.o.   OB History        5    Para   3    Term   3            AB   2    Living   3       SAB   2    TAB        Ectopic        Molar        Multiple   0    Live Births   3             s/p C/S pod# 2  1. Doing well, continue routine post-op care.

## 2020-07-09 NOTE — FLOWSHEET NOTE
Call placed to Dr. Jackelyn Joyner, clarified order for Percocet (Q3H PRN or Q4H PRN). Order received for Q3H PRN.

## 2020-07-09 NOTE — PROGRESS NOTES
Discharge Phone Call [facility] Care Provider:   Michelene Saint  Discharge Date: 2020  Disposition of baby: dc  Phone Number:    Attempts to Contact:  Date:    Nurse  Date:    Nurse  Date:    Nurse    My name is_____. I am an employee at the [facility]. We are calling patients who have been recently been cared for here to see how you are doing. Is this a good time to speak with you? All your answers are kept confidential so feel free to comment positively or negatively. Often times we look at patient comments as a way to help us identify opportunities for improvement. 1. Do you have any questions about your discharge instructions? In order to improve our discharge process, can you share with me what your experience was  and how we could improve this process? 2. Were you informed about the  channel while you were a patient here? Yes/No      Did you find it valuable? Yes/No  3. Are you having much pain? Yes/No  (method of delivery vaginal/)      How are you managing your pain?__________________________________________________  4. How are you feeding your baby?____________________________________________________      If breastfeeding, are you satisfied with the breastfeeding support services offered? Yes/No  5. Have you made or have you already had your first appointment with the baby's doctor? Yes/No      If no, do you know when to schedule the follow up appointment?__________________________  6. Have you scheduled your follow up appointment? Yes/No       If not, do you know when to schedule it? Yes/No  7. We strive to provide excellent care for our patients. What do you think of the care you received? Comments:_____________________________________________________________________  8.  Is there anything you can think of that would make your stay better or we could have done            differently?_______________________________________________________________________ **NOTE** If negative comments then: Thank you for your concerns. I apologize. Would you like a   follow-up phone call with our manager? 9. Was there anyone in particular you would like to mention who provided care for you? I would be     happy to take their names and comments. ___________________________________________    Ms.______, in closing we would like you to be aware that you will be receiving a paper survey in the mail. We would appreciate it if you would take the time to complete it and return it back to us. Your comments, both positive and negative, provides us with feedback we need so that we can meet the needs of our community. Questions During Interview:__________________________________________________________    Teaching During interview :___________________________________________________________      ___________________________RN       Date:_______________   Time:________________            Discharge Phone Call Log    OB Care Provider:     Discharge Date:  Disposition of baby:   Phone Number:    Attempts to Contact:  Date:    Nurse  Date:    Nurse  Date:    Nurse    My name is_____. I am an employee at the [facility]. We are calling patients who have been recently been cared for here to see how you are doing. Is this a good time to speak with you? All your answers are kept confidential so feel free to comment positively or negatively. Often times we look at patient comments as a way to help us identify opportunities for improvement. 1. Do you have any questions about your discharge instructions? Yes/No       In order to improve our discharge process, can you share with me what your experience was  and how we could improve this process?______________________  2. Were you informed about the  channel while you were a patient here? Yes/No      Did you find it valuable? Yes/No  3. Are you having much pain?   Yes/No  (method of delivery vaginal/)      How are you

## 2020-07-09 NOTE — DISCHARGE SUMMARY
Department of Obstetrics and Gynecology  Postpartum Discharge Summary      Admit Date: 2020    Admit Diagnosis: Hx of  section    Discharge Date: 20    Condition at Discharge: stable    Discharge Diagnoses: repeat C section    Discharge Disposition:  Home    Service: Obstetrics    Postpartum complications: none     Hospital Course: uncomplicated     Data:  Delivery Date:   2020   1:41 PM     Weight   Information for the patient's :  Kira Dotson [[id number]]   Birth Weight: 6 lb 15.5 oz (3.16 kg)    Apgars   Information for the patient's :  Kira Dotson [[id number]]   APGAR One: 8     Apgars   Information for the patient's :  Kria Dotson [[id number]]   APGAR Five: 9    Disposition of Baby:  Home with mother    Current Discharge Medication List      START taking these medications    Details   oxyCODONE-acetaminophen (PERCOCET) 5-325 MG per tablet Take 1 tablet by mouth every 6 hours as needed for Pain for up to 7 days. Qty: 28 tablet, Refills: 0    Comments: Reduce doses taken as pain becomes manageable  Associated Diagnoses:  delivery delivered      ibuprofen (ADVIL;MOTRIN) 800 MG tablet Take 1 tablet by mouth every 8 hours  Qty: 120 tablet, Refills: 3         CONTINUE these medications which have CHANGED    Details   valACYclovir (VALTREX) 500 MG tablet Take 1 tablet by mouth daily  Qty: 30 tablet, Refills: 3         CONTINUE these medications which have NOT CHANGED    Details   Prenatal Vit-Fe Fumarate-FA (PRENATAL 1+1 PO) Take 1 tablet by mouth daily      ferrous sulfate (IRON 325) 325 (65 Fe) MG tablet Take 325 mg by mouth daily (with breakfast)         STOP taking these medications       omeprazole (PRILOSEC) 10 MG delayed release capsule Comments:   Reason for Stopping:         docusate sodium (COLACE) 100 MG capsule Comments:   Reason for Stopping:                Follow-up: 6 weeks postpartum check-up        Electronically signed by Shirley Velazquez MD on 7/9/2020 at 11:08 AM

## 2020-10-20 ENCOUNTER — VIRTUAL VISIT (OUTPATIENT)
Dept: FAMILY MEDICINE CLINIC | Age: 30
End: 2020-10-20
Payer: COMMERCIAL

## 2020-10-20 PROCEDURE — 99202 OFFICE O/P NEW SF 15 MIN: CPT | Performed by: NURSE PRACTITIONER

## 2020-10-20 RX ORDER — METHYLPREDNISOLONE 4 MG/1
TABLET ORAL
Qty: 1 KIT | Refills: 0 | Status: SHIPPED | OUTPATIENT
Start: 2020-10-20 | End: 2020-10-27 | Stop reason: ALTCHOICE

## 2020-10-20 RX ORDER — CYCLOBENZAPRINE HCL 10 MG
10 TABLET ORAL NIGHTLY PRN
Qty: 10 TABLET | Refills: 0 | Status: SHIPPED | OUTPATIENT
Start: 2020-10-20 | End: 2020-10-27 | Stop reason: ALTCHOICE

## 2020-10-20 ASSESSMENT — PATIENT HEALTH QUESTIONNAIRE - PHQ9
SUM OF ALL RESPONSES TO PHQ QUESTIONS 1-9: 0
1. LITTLE INTEREST OR PLEASURE IN DOING THINGS: 0
SUM OF ALL RESPONSES TO PHQ9 QUESTIONS 1 & 2: 0
SUM OF ALL RESPONSES TO PHQ QUESTIONS 1-9: 0
2. FEELING DOWN, DEPRESSED OR HOPELESS: 0
SUM OF ALL RESPONSES TO PHQ QUESTIONS 1-9: 0

## 2020-10-20 ASSESSMENT — ENCOUNTER SYMPTOMS
SHORTNESS OF BREATH: 0
NAUSEA: 0
VOMITING: 0
DIARRHEA: 0
COUGH: 0

## 2020-10-27 ENCOUNTER — OFFICE VISIT (OUTPATIENT)
Dept: ORTHOPEDIC SURGERY | Age: 30
End: 2020-10-27
Payer: COMMERCIAL

## 2020-10-27 VITALS — HEIGHT: 64 IN | BODY MASS INDEX: 23.05 KG/M2 | WEIGHT: 135 LBS

## 2020-10-27 PROCEDURE — 99242 OFF/OP CONSLTJ NEW/EST SF 20: CPT | Performed by: ORTHOPAEDIC SURGERY

## 2020-10-27 RX ORDER — LEVONORGESTREL AND ETHINYL ESTRADIOL 0.1-0.02MG
1 KIT ORAL
COMMUNITY
Start: 2020-10-14 | End: 2021-10-09
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
## 2020-12-14 ENCOUNTER — VIRTUAL VISIT (OUTPATIENT)
Dept: FAMILY MEDICINE CLINIC | Age: 30
End: 2020-12-14
Payer: COMMERCIAL

## 2020-12-14 PROCEDURE — 99214 OFFICE O/P EST MOD 30 MIN: CPT | Performed by: NURSE PRACTITIONER

## 2020-12-14 ASSESSMENT — ENCOUNTER SYMPTOMS: RESPIRATORY NEGATIVE: 1

## 2020-12-14 ASSESSMENT — PATIENT HEALTH QUESTIONNAIRE - PHQ9
2. FEELING DOWN, DEPRESSED OR HOPELESS: 0
SUM OF ALL RESPONSES TO PHQ QUESTIONS 1-9: 0
1. LITTLE INTEREST OR PLEASURE IN DOING THINGS: 0
SUM OF ALL RESPONSES TO PHQ9 QUESTIONS 1 & 2: 0

## 2020-12-14 NOTE — PATIENT INSTRUCTIONS
Left foot lab work in more specific for iron deficiency anemia. I will call her with the results. The meantime of asked her to keep a diary of when these bruises occur and what she is doing at that time.
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
## 2020-12-15 DIAGNOSIS — D50.8 OTHER IRON DEFICIENCY ANEMIA: ICD-10-CM

## 2020-12-15 LAB
A/G RATIO: 1.8 (ref 1.1–2.2)
ALBUMIN SERPL-MCNC: 4.4 G/DL (ref 3.4–5)
ALP BLD-CCNC: 44 U/L (ref 40–129)
ALT SERPL-CCNC: 10 U/L (ref 10–40)
ANION GAP SERPL CALCULATED.3IONS-SCNC: 11 MMOL/L (ref 3–16)
AST SERPL-CCNC: 14 U/L (ref 15–37)
BASOPHILS ABSOLUTE: 0 K/UL (ref 0–0.2)
BASOPHILS RELATIVE PERCENT: 0.4 %
BILIRUB SERPL-MCNC: 0.3 MG/DL (ref 0–1)
BUN BLDV-MCNC: 16 MG/DL (ref 7–20)
CALCIUM SERPL-MCNC: 8.9 MG/DL (ref 8.3–10.6)
CHLORIDE BLD-SCNC: 106 MMOL/L (ref 99–110)
CO2: 24 MMOL/L (ref 21–32)
CREAT SERPL-MCNC: 0.6 MG/DL (ref 0.6–1.1)
EOSINOPHILS ABSOLUTE: 0.1 K/UL (ref 0–0.6)
EOSINOPHILS RELATIVE PERCENT: 3 %
FERRITIN: 8.2 NG/ML (ref 15–150)
GFR AFRICAN AMERICAN: >60
GFR NON-AFRICAN AMERICAN: >60
GLOBULIN: 2.4 G/DL
GLUCOSE BLD-MCNC: 99 MG/DL (ref 70–99)
HCT VFR BLD CALC: 37.4 % (ref 36–48)
HEMOGLOBIN: 12.5 G/DL (ref 12–16)
IRON SATURATION: 75 % (ref 15–50)
IRON: 311 UG/DL (ref 37–145)
LYMPHOCYTES ABSOLUTE: 2 K/UL (ref 1–5.1)
LYMPHOCYTES RELATIVE PERCENT: 42.7 %
MCH RBC QN AUTO: 32.9 PG (ref 26–34)
MCHC RBC AUTO-ENTMCNC: 33.3 G/DL (ref 31–36)
MCV RBC AUTO: 98.8 FL (ref 80–100)
MONOCYTES ABSOLUTE: 0.3 K/UL (ref 0–1.3)
MONOCYTES RELATIVE PERCENT: 7.4 %
NEUTROPHILS ABSOLUTE: 2.1 K/UL (ref 1.7–7.7)
NEUTROPHILS RELATIVE PERCENT: 46.5 %
PDW BLD-RTO: 13.1 % (ref 12.4–15.4)
PLATELET # BLD: 263 K/UL (ref 135–450)
PMV BLD AUTO: 8.9 FL (ref 5–10.5)
POTASSIUM SERPL-SCNC: 4.1 MMOL/L (ref 3.5–5.1)
RBC # BLD: 3.79 M/UL (ref 4–5.2)
SODIUM BLD-SCNC: 141 MMOL/L (ref 136–145)
TOTAL IRON BINDING CAPACITY: 413 UG/DL (ref 260–445)
TOTAL PROTEIN: 6.8 G/DL (ref 6.4–8.2)
WBC # BLD: 4.6 K/UL (ref 4–11)

## 2020-12-18 ENCOUNTER — TELEPHONE (OUTPATIENT)
Dept: FAMILY MEDICINE CLINIC | Age: 30
End: 2020-12-18

## 2020-12-18 RX ORDER — LANOLIN ALCOHOL/MO/W.PET/CERES
325 CREAM (GRAM) TOPICAL 2 TIMES DAILY WITH MEALS
Qty: 60 TABLET | Refills: 2 | Status: SHIPPED | OUTPATIENT
Start: 2020-12-18

## 2020-12-18 NOTE — TELEPHONE ENCOUNTER
Please call pt and let her know her most recent las. Of significance I a low ferritin level 8.2.   This tells me her iron stores are not yet full so she should still be on oral supplements for 3 months and then I will recheck her levels  I will place the order  Thank you

## 2021-04-01 ENCOUNTER — VIRTUAL VISIT (OUTPATIENT)
Dept: FAMILY MEDICINE CLINIC | Age: 31
End: 2021-04-01
Payer: COMMERCIAL

## 2021-04-01 DIAGNOSIS — J40 BRONCHITIS: Primary | ICD-10-CM

## 2021-04-01 PROCEDURE — 99212 OFFICE O/P EST SF 10 MIN: CPT | Performed by: NURSE PRACTITIONER

## 2021-04-01 RX ORDER — AZITHROMYCIN 250 MG/1
TABLET, FILM COATED ORAL
Qty: 6 TABLET | Refills: 0 | Status: SHIPPED | OUTPATIENT
Start: 2021-04-01

## 2021-04-01 ASSESSMENT — ENCOUNTER SYMPTOMS
EYE DISCHARGE: 1
DIARRHEA: 0
EYE PAIN: 0
NAUSEA: 0
EYE REDNESS: 1
COUGH: 1
VOMITING: 0
SHORTNESS OF BREATH: 0

## 2021-04-01 NOTE — PROGRESS NOTES
Daniel Maurer (:  1990) is a 33 y.o. female,Established patient, here for evaluation of the following chief complaint(s): Eye Problem      ASSESSMENT/PLAN:  1. Bronchitis  -     azithromycin (ZITHROMAX) 250 MG tablet; 500mg on day 1 followed by 250mg on days 2 - 5, Disp-6 tablet, R-0Normal      Return if symptoms worsen or fail to improve. SUBJECTIVE/OBJECTIVE:  HPI  Left eye redness, crusting this morning when she woke up, complains of feeling tired. Eye has improved greatly since this morning. Denies eye pain, visual disturbances. Slight cough. Her youngest child has a cold currently. She does not have a fever, shortness of breath. Leaving for [place] this weekend is worried about being sick down there. Anemia: VIKI, stopped ferrous sulfate due to GI side effects (constipation, abdominal cramping)- side effects occurred even with once daily dosing. has been trying to increase intake of high iron foods. Review of Systems   Constitutional: Positive for fatigue. Negative for chills and fever. Eyes: Positive for discharge and redness. Negative for pain. Respiratory: Positive for cough. Negative for shortness of breath. Cardiovascular: Negative for chest pain and leg swelling. Gastrointestinal: Negative for diarrhea, nausea and vomiting. Neurological: Negative for dizziness and headaches. All other systems reviewed and are negative.       Patient-Reported Vitals 2020   Patient-Reported Weight 130lb   Patient-Reported Height 5'4\"        Physical Exam    [INSTRUCTIONS:  \"[x]\" Indicates a positive item  \"[]\" Indicates a negative item  -- DELETE ALL ITEMS NOT EXAMINED]    Constitutional: [x] Appears well-developed and well-nourished [x] No apparent distress      [] Abnormal -     Mental status: [x] Alert and awake  [x] Oriented to person/place/time [x] Able to follow commands    [] Abnormal -     Eyes:   EOM    [x]  Normal    [] Abnormal -   Sclera  [x]  Normal    [] Abnormal -

## 2021-11-16 ENCOUNTER — VIRTUAL VISIT (OUTPATIENT)
Dept: FAMILY MEDICINE CLINIC | Age: 31
End: 2021-11-16
Payer: COMMERCIAL

## 2021-11-16 DIAGNOSIS — M25.512 ACUTE PAIN OF LEFT SHOULDER: Primary | ICD-10-CM

## 2021-11-16 PROCEDURE — 99212 OFFICE O/P EST SF 10 MIN: CPT | Performed by: NURSE PRACTITIONER

## 2021-11-16 RX ORDER — METHYLPREDNISOLONE 4 MG/1
TABLET ORAL
Qty: 1 KIT | Refills: 0 | Status: SHIPPED | OUTPATIENT
Start: 2021-11-16

## 2021-11-16 RX ORDER — CYCLOBENZAPRINE HCL 10 MG
10 TABLET ORAL NIGHTLY PRN
Qty: 10 TABLET | Refills: 0 | Status: SHIPPED | OUTPATIENT
Start: 2021-11-16 | End: 2021-11-26

## 2021-11-16 NOTE — PATIENT INSTRUCTIONS
Medrol dose pack (oral steroid taper)- you should avoid taking NSAIDs while on this medication (ex: ibuprofen, aleve, aspirin). Tylenol is OK to take.      Flexeril muscle relaxer as needed- may cause sedation    Make appointment with ortho
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
